# Patient Record
Sex: MALE
[De-identification: names, ages, dates, MRNs, and addresses within clinical notes are randomized per-mention and may not be internally consistent; named-entity substitution may affect disease eponyms.]

---

## 2023-06-06 ENCOUNTER — APPOINTMENT (OUTPATIENT)
Dept: NEUROLOGY | Facility: CLINIC | Age: 63
End: 2023-06-06
Payer: COMMERCIAL

## 2023-06-06 VITALS — SYSTOLIC BLOOD PRESSURE: 190 MMHG | DIASTOLIC BLOOD PRESSURE: 108 MMHG | HEART RATE: 82 BPM

## 2023-06-06 VITALS — WEIGHT: 260 LBS | BODY MASS INDEX: 35.21 KG/M2 | HEIGHT: 72 IN

## 2023-06-06 DIAGNOSIS — R41.82 ALTERED MENTAL STATUS, UNSPECIFIED: ICD-10-CM

## 2023-06-06 DIAGNOSIS — G47.30 SLEEP APNEA, UNSPECIFIED: ICD-10-CM

## 2023-06-06 PROBLEM — Z00.00 ENCOUNTER FOR PREVENTIVE HEALTH EXAMINATION: Status: ACTIVE | Noted: 2023-06-06

## 2023-06-06 PROCEDURE — 99204 OFFICE O/P NEW MOD 45 MIN: CPT

## 2023-06-06 NOTE — HISTORY OF PRESENT ILLNESS
[FreeTextEntry1] : It's a pleasure to see Mr. HENRIK WILLIAMSON In the office today. He is a 62 year -  old man  who presents to the office today for the evaluation of confusional awakening.  He reports that he had always been a bad sleeper which he attributed to his occupation as a  and working in the security business. However, starting few months ago he has been waking up confused and disoriented in the morning for minutes.  It has always happened after he wake up the second time.  he usually wakes around 6am to go to the bathroom and goes back to bed again for another 1-1 1/2 hours, but its always when he wakes up the second time that he gets confused.\par   \par He does report snoring, especiallly after drinking alcohol but he does not drink frequently.  he denies gasping episodes or witnessed apnea,  RLS/PLMD, narcolepsy symtpoms.  he also denies feeling tired once he is fully awake.  his epworth sleepiness score today is 5.\par \par this started few months ago when he stopped going to the gym, and is intending to go back.\par \par

## 2023-06-06 NOTE — ASSESSMENT
[FreeTextEntry1] : confusional arousal/altered mental status\par \par -MRI of the brain\par -maybe secondary to undiagnosed sleep apnea, will send him for a sleep study\par \par \par \par \par I, Vivian Anna, Attest that this documentation has been prepared under the direction and in the presence of Provider Kunal Berg DO\par \par Thank You for letting me assist in the management of this patient. \par \par Kunal Berg DO\[place] Board Certified, Neurology\par  \par